# Patient Record
Sex: MALE | Race: WHITE
[De-identification: names, ages, dates, MRNs, and addresses within clinical notes are randomized per-mention and may not be internally consistent; named-entity substitution may affect disease eponyms.]

---

## 2020-08-20 ENCOUNTER — HOSPITAL ENCOUNTER (EMERGENCY)
Dept: HOSPITAL 53 - M ED | Age: 35
Discharge: HOME | End: 2020-08-20
Payer: COMMERCIAL

## 2020-08-20 VITALS — DIASTOLIC BLOOD PRESSURE: 83 MMHG | SYSTOLIC BLOOD PRESSURE: 147 MMHG

## 2020-08-20 VITALS — HEIGHT: 76 IN | BODY MASS INDEX: 31.71 KG/M2 | WEIGHT: 260.37 LBS

## 2020-08-20 DIAGNOSIS — R60.0: ICD-10-CM

## 2020-08-20 DIAGNOSIS — I10: ICD-10-CM

## 2020-08-20 DIAGNOSIS — Z79.899: ICD-10-CM

## 2020-08-20 DIAGNOSIS — M71.22: Primary | ICD-10-CM

## 2020-08-20 DIAGNOSIS — Z79.891: ICD-10-CM

## 2020-08-20 DIAGNOSIS — M79.652: ICD-10-CM

## 2020-08-20 NOTE — REPVR
PROCEDURE INFORMATION: 

Exam: US Duplex Left Lower Extremity Veins, Limited 

Exam date and time: 8/20/2020 11:38 AM 

Age: 34 years old 

Clinical indication: Swelling (edema) of limb; Lower extremity, left; 

Additional info: Left lower extremity swelling, rule out DVT 



TECHNIQUE: 

Imaging protocol: Real-time Duplex ultrasound of the Left Lower Extremity with 

2-D gray scale, color Doppler flow and spectral waveform analysis with image 

documentation. Limited exam focused on the left lower extremity veins. 



COMPARISON: 

No relevant prior studies available. 



FINDINGS: 

Left deep veins: The common femoral, femoral, and popliteal veins are patent 

without thrombus. Normal compressibility and/or augmentation response. 



Soft tissues: There is a 3.8 x 1.3 x 3.1 cm heterogeneous anechoic and 

echogenic complex-appearing fluid collection present within the popliteal fossa 

soft tissues. There is mild diffuse hypoechoic fluid within the subcutaneous 

fat adjacent to the collection the popliteal fossa. 



IMPRESSION: 

1. No evidence of deep vein thrombosis in the visualized lower extremity. 

2. Complex fluid collection at the popliteal fossa. This could represent a 

complex popliteal fossa cyst, however no definitive communication with the knee 

joint is demonstrated on this ultrasound. Clinical correlation for other 

complex fluid collection such as a hematoma or abscess is also needed. 



Electronically signed by: Lee Baird On 08/20/2020  12:09:46 PM

## 2021-07-16 ENCOUNTER — HOSPITAL ENCOUNTER (EMERGENCY)
Dept: HOSPITAL 53 - M ED | Age: 36
Discharge: LEFT BEFORE DISCHARGE | End: 2021-07-16
Payer: COMMERCIAL

## 2021-07-16 VITALS
HEIGHT: 76 IN | BODY MASS INDEX: 29.9 KG/M2 | DIASTOLIC BLOOD PRESSURE: 99 MMHG | WEIGHT: 245.51 LBS | SYSTOLIC BLOOD PRESSURE: 197 MMHG

## 2021-07-16 DIAGNOSIS — Z53.21: Primary | ICD-10-CM

## 2021-07-20 ENCOUNTER — HOSPITAL ENCOUNTER (EMERGENCY)
Dept: HOSPITAL 53 - M ED | Age: 36
Discharge: HOME | End: 2021-07-20
Payer: COMMERCIAL

## 2021-07-20 VITALS — DIASTOLIC BLOOD PRESSURE: 96 MMHG | SYSTOLIC BLOOD PRESSURE: 162 MMHG

## 2021-07-20 VITALS — HEIGHT: 76 IN | WEIGHT: 245.51 LBS | BODY MASS INDEX: 29.9 KG/M2

## 2021-07-20 DIAGNOSIS — F17.200: ICD-10-CM

## 2021-07-20 DIAGNOSIS — I10: ICD-10-CM

## 2021-07-20 DIAGNOSIS — M79.662: Primary | ICD-10-CM

## 2021-07-20 NOTE — REP
INDICATION:

swelling/pain



COMPARISON:

08/20/2020.



TECHNIQUE:

Real time compression and duplex Doppler interrogation of the left lower extremity

deep venous system is performed, including the right common femoral vein.Compression

of the left peroneal and posterior tibial veins is performed.



FINDINGS:

The left common femoral, superficial femoral and popliteal veins are fully

compressible with transducer pressure and demonstrate normal spontaneous and phasic

flow, without evidence of deep venous thrombosis.The right common femoral vein

demonstrates no thrombus.The visualized left peroneal and posterior tibial veins

demonstrate no thrombus, evaluation is limited due to edema..  In the medial upper

left calf soft tissues there is a complex fluid collection measuring 13.4 x 1.4 x 3.4

cm.



IMPRESSION:

No evidence of deep venous thrombosis of the left lower extremity femoral popliteal

venous system.No thrombus in the visualized left peroneal and posterior tibial veins.

In the medial upper left calf soft tissues there is a complex fluid collection

measuring 13.4 x 1.4 x 3.4 cm.





<Electronically signed by Zbigniew Bella > 07/20/21 [id number]

## 2021-07-21 NOTE — ED PDOC
Post-Departure Follow-Up


dr smith faxed foal report of leg us. vinay reddy calling pt to ensure pt ha

s follow up for  reassessment. She will provide dr medellin name and number ,mlg Lundborg-Gray,Maja MD          Jul 21, 2021 10:32